# Patient Record
Sex: MALE
[De-identification: names, ages, dates, MRNs, and addresses within clinical notes are randomized per-mention and may not be internally consistent; named-entity substitution may affect disease eponyms.]

---

## 2022-04-11 PROBLEM — Z00.00 ENCOUNTER FOR PREVENTIVE HEALTH EXAMINATION: Noted: 2022-04-11

## 2022-04-22 PROBLEM — Z00.00 ENCOUNTER FOR PREVENTIVE HEALTH EXAMINATION: Status: ACTIVE | Noted: 2022-04-22

## 2022-05-12 ENCOUNTER — APPOINTMENT (OUTPATIENT)
Dept: ORTHOPEDIC SURGERY | Facility: CLINIC | Age: 67
End: 2022-05-12

## 2022-05-12 ENCOUNTER — NON-APPOINTMENT (OUTPATIENT)
Age: 67
End: 2022-05-12

## 2022-05-12 DIAGNOSIS — M54.50 LOW BACK PAIN, UNSPECIFIED: ICD-10-CM

## 2022-05-12 PROCEDURE — 99212 OFFICE O/P EST SF 10 MIN: CPT

## 2022-05-12 NOTE — REASON FOR VISIT
[FreeTextEntry2] : follow up/ lower back 
[FreeTextEntry2] : follow up/ lower back 
Patient Representative

## 2022-05-12 NOTE — IMAGING
[de-identified] : General:\par Strength: \par Hip Flexors: 5/5 b/l\par Quadriceps: 5/5 b/l\par Dorsi/Plantor flexion: 5/5 b/l\par EHL: 5/5 b/l\par \par Sensation: Intact b/l\par Straight Leg Raise: Negative b/l\par

## 2022-05-12 NOTE — ASSESSMENT
[FreeTextEntry1] : 66 y/o M with low back pain, improving with PT. Had good relieve with TPI given at last visit. Encouraged to continue to do exercises at home. Follow up PRN.

## 2022-05-12 NOTE — HISTORY OF PRESENT ILLNESS
[Gradual] : gradual [de-identified] : Injury Details: spine. Details of accident / injury: back surgery 1989 . The injury was gradual. On a scale of 0-10, the\par pain when active is 6. On a scale of 0-10, the pain when at rest is 7. The pain gets worse with motion. The quality of\par the pain is: Dull/Aching, Radiating, Shooting and Tingling The patient does not need support to ambulate. The patient\par was treated for this problem before, on 2/28/2022 by Dr. Guerrier.  [Lower back] : lower back [0] : 0 [Physical therapy] : physical therapy [Retired] : Work status: retired [] : no

## 2022-05-12 NOTE — HISTORY OF PRESENT ILLNESS
[Gradual] : gradual [de-identified] : Injury Details: spine. Details of accident / injury: back surgery 1989 . The injury was gradual. On a scale of 0-10, the\par pain when active is 6. On a scale of 0-10, the pain when at rest is 7. The pain gets worse with motion. The quality of\par the pain is: Dull/Aching, Radiating, Shooting and Tingling The patient does not need support to ambulate. The patient\par was treated for this problem before, on 2/28/2022 by Dr. Guerrier.  [Lower back] : lower back [0] : 0 [Physical therapy] : physical therapy [Retired] : Work status: retired [] : no

## 2024-10-15 NOTE — REASON FOR VISIT
Visit Information    Have you changed or started any medications since your last visit including any over-the-counter medicines, vitamins, or herbal medicines? no   Are you having any side effects from any of your medications? -  no  Have you stopped taking any of your medications? Is so, why? -  no    Have you seen any other physician or provider since your last visit? Yes - Records Requested  Have you had any other diagnostic tests since your last visit? Yes - Records Requested  Have you been seen in the emergency room and/or had an admission to a hospital since we last saw you? No  Have you had your routine dental cleaning in the past 6 months? no    Have you activated your [employer] account? If not, what are your barriers? Yes     Patient Care Team:  Reji Arambula MD as Consulting Physician (Orthopedic Surgery)    Medical History Review  Past Medical, Family, and Social History reviewed and does not contribute to the patient presenting condition    Health Maintenance   Topic Date Due    Pneumococcal 0-64 years Vaccine (1 of 2 - PCV) 05/03/2006    Depression Screen  Never done    DTaP/Tdap/Td vaccine (7 - Td or Tdap) 08/12/2021    Chlamydia/GC screen  02/21/2023    Flu vaccine (1) 08/01/2024    COVID-19 Vaccine (1 - 2023-24 season) Never done    Pap smear  02/21/2025    Hepatitis A vaccine  Completed    Hepatitis B vaccine  Completed    Hib vaccine  Completed    HPV vaccine  Completed    Polio vaccine  Completed    Varicella vaccine  Completed    Meningococcal (ACWY) vaccine  Completed    Hepatitis C screen  Completed    HIV screen  Completed       
Position: Sitting, Cuff Size: Medium Adult)   Pulse 67   Ht 1.6 m (5' 2.99\")   Wt 94.1 kg (207 lb 6.4 oz)   BMI 36.75 kg/m²    BP Readings from Last 3 Encounters:   10/15/24 131/85   09/12/24 (!) 133/94   09/11/24 129/81       Physical Exam  Vitals and nursing note reviewed.   Constitutional:       Appearance: Normal appearance.   Cardiovascular:      Rate and Rhythm: Normal rate and regular rhythm.      Heart sounds: Normal heart sounds.   Pulmonary:      Effort: Pulmonary effort is normal.      Breath sounds: Normal breath sounds.   Abdominal:      General: Abdomen is flat.      Tenderness: There is no abdominal tenderness.   Musculoskeletal:        Arms:       Right lower leg: No edema.      Left lower leg: No edema.      Comments: Tender points   Lymphadenopathy:      Cervical: No cervical adenopathy.   Neurological:      Mental Status: She is alert.   Psychiatric:         Mood and Affect: Mood normal.         Behavior: Behavior normal.         Thought Content: Thought content normal.         Judgment: Judgment normal.     Back    Lab Results   Component Value Date    WBC 5.5 10/07/2020    HGB 13.5 10/07/2020    HCT 41.3 10/07/2020     10/07/2020    CHOL 184 09/13/2019    TRIG 129 09/13/2019    HDL 51 09/13/2019    ALT 10 10/07/2020    CREATININE 0.57 10/07/2020    LABA1C 4.9 09/13/2019     No results found for: \"CALCIUM\", \"PHOS\"  No results found for: \"LDLDIRECT\"    Assessment and Plan:    1. ADHD on Adderall 20 mg bid - PDMP reviewed . LAs t script 1 month ago/medication contract signed.  - Urine Drug Screen;    2. LSIL in 2022- Offered PAP. Patient would like to see her OB/Gyn  3.SINDY- score 12- Start Buspar 5 mg bid  4.Musculoskeletal mid back pain- back exercises/Motrin as needed.         Requested Prescriptions     Signed Prescriptions Disp Refills    busPIRone (BUSPAR) 5 MG tablet 60 tablet 0     Sig: Take 1 tablet by mouth 2 times daily    ibuprofen (ADVIL;MOTRIN) 800 MG tablet 120 tablet 1     
[FreeTextEntry2] : follow up/

## 2025-02-26 ENCOUNTER — APPOINTMENT (OUTPATIENT)
Dept: ORTHOPEDIC SURGERY | Facility: CLINIC | Age: 70
End: 2025-02-26
Payer: MEDICARE

## 2025-02-26 VITALS — HEIGHT: 66 IN | BODY MASS INDEX: 26.52 KG/M2 | WEIGHT: 165 LBS

## 2025-02-26 DIAGNOSIS — S46.011A STRAIN OF MUSCLE(S) AND TENDON(S) OF THE ROTATOR CUFF OF RIGHT SHOULDER, INITIAL ENCOUNTER: ICD-10-CM

## 2025-02-26 DIAGNOSIS — S43.431A SUPERIOR GLENOID LABRUM LESION OF RIGHT SHOULDER, INITIAL ENCOUNTER: ICD-10-CM

## 2025-02-26 DIAGNOSIS — E78.00 PURE HYPERCHOLESTEROLEMIA, UNSPECIFIED: ICD-10-CM

## 2025-02-26 DIAGNOSIS — I10 ESSENTIAL (PRIMARY) HYPERTENSION: ICD-10-CM

## 2025-02-26 PROCEDURE — 99214 OFFICE O/P EST MOD 30 MIN: CPT | Mod: 25

## 2025-02-26 PROCEDURE — 73030 X-RAY EXAM OF SHOULDER: CPT | Mod: RT

## 2025-02-26 PROCEDURE — 20611 DRAIN/INJ JOINT/BURSA W/US: CPT | Mod: RT

## 2025-02-26 PROCEDURE — J3490M: CUSTOM | Mod: NC,JZ

## 2025-02-26 RX ORDER — AMOXICILLIN 500 MG/1
CAPSULE ORAL
Refills: 0 | Status: ACTIVE | COMMUNITY

## 2025-02-26 RX ORDER — ROSUVASTATIN CALCIUM 20 MG/1
20 TABLET, FILM COATED ORAL
Refills: 0 | Status: ACTIVE | COMMUNITY

## 2025-02-26 RX ORDER — BENAZEPRIL HYDROCHLORIDE 10 MG/1
10 TABLET, FILM COATED ORAL
Refills: 0 | Status: ACTIVE | COMMUNITY

## 2025-02-26 RX ORDER — MELOXICAM 15 MG/1
15 TABLET ORAL
Qty: 30 | Refills: 1 | Status: ACTIVE | COMMUNITY
Start: 2025-02-26 | End: 1900-01-01

## 2025-02-27 ENCOUNTER — APPOINTMENT (OUTPATIENT)
Dept: MRI IMAGING | Facility: CLINIC | Age: 70
End: 2025-02-27
Payer: MEDICARE

## 2025-02-27 PROCEDURE — 73221 MRI JOINT UPR EXTREM W/O DYE: CPT | Mod: RT

## 2025-03-03 PROBLEM — S43.431A SUPERIOR GLENOID LABRUM LESION OF RIGHT SHOULDER, INITIAL ENCOUNTER: Status: ACTIVE | Noted: 2025-03-03

## 2025-03-03 PROBLEM — S46.011A TRAUMATIC COMPLETE TEAR OF RIGHT ROTATOR CUFF, INITIAL ENCOUNTER: Status: ACTIVE | Noted: 2025-02-26

## 2025-03-12 ENCOUNTER — APPOINTMENT (OUTPATIENT)
Dept: ORTHOPEDIC SURGERY | Facility: CLINIC | Age: 70
End: 2025-03-12
Payer: MEDICARE

## 2025-03-12 DIAGNOSIS — S43.431A SUPERIOR GLENOID LABRUM LESION OF RIGHT SHOULDER, INITIAL ENCOUNTER: ICD-10-CM

## 2025-03-12 DIAGNOSIS — S43.001A UNSPECIFIED SUBLUXATION OF RIGHT SHOULDER JOINT, INITIAL ENCOUNTER: ICD-10-CM

## 2025-03-12 DIAGNOSIS — S46.811A STRAIN OF OTHER MUSCLES, FASCIA AND TENDONS AT SHOULDER AND UPPER ARM LEVEL, RIGHT ARM, INITIAL ENCOUNTER: ICD-10-CM

## 2025-03-12 PROCEDURE — 99214 OFFICE O/P EST MOD 30 MIN: CPT

## 2025-04-24 ENCOUNTER — APPOINTMENT (OUTPATIENT)
Dept: ORTHOPEDIC SURGERY | Facility: CLINIC | Age: 70
End: 2025-04-24

## 2025-04-24 PROCEDURE — L3670: CPT | Mod: RT

## 2025-04-28 ENCOUNTER — APPOINTMENT (OUTPATIENT)
Age: 70
End: 2025-04-28
Payer: MEDICARE

## 2025-04-28 PROCEDURE — 29828 SHO ARTHRS SRG BICP TENODSIS: CPT | Mod: RT

## 2025-04-28 PROCEDURE — 29826 SHO ARTHRS SRG DECOMPRESSION: CPT | Mod: AS,RT

## 2025-04-28 PROCEDURE — 29824 SHO ARTHRS SRG DSTL CLAVICLC: CPT | Mod: RT

## 2025-04-28 PROCEDURE — 29823 SHO ARTHRS SRG XTNSV DBRDMT: CPT | Mod: 59,RT

## 2025-04-28 PROCEDURE — 29826 SHO ARTHRS SRG DECOMPRESSION: CPT | Mod: RT

## 2025-04-28 PROCEDURE — 29828 SHO ARTHRS SRG BICP TENODSIS: CPT | Mod: AS,RT

## 2025-04-28 PROCEDURE — 29823 SHO ARTHRS SRG XTNSV DBRDMT: CPT | Mod: AS,59,RT

## 2025-04-28 PROCEDURE — 29824 SHO ARTHRS SRG DSTL CLAVICLC: CPT | Mod: AS,RT

## 2025-05-06 ENCOUNTER — APPOINTMENT (OUTPATIENT)
Dept: ORTHOPEDIC SURGERY | Facility: CLINIC | Age: 70
End: 2025-05-06
Payer: MEDICARE

## 2025-05-06 VITALS — WEIGHT: 165 LBS | HEIGHT: 66 IN | BODY MASS INDEX: 26.52 KG/M2

## 2025-05-06 DIAGNOSIS — M75.51 BURSITIS OF RIGHT SHOULDER: ICD-10-CM

## 2025-05-06 DIAGNOSIS — Z98.890 OTHER SPECIFIED POSTPROCEDURAL STATES: ICD-10-CM

## 2025-05-06 PROCEDURE — 99024 POSTOP FOLLOW-UP VISIT: CPT

## 2025-05-06 RX ORDER — OXYCODONE AND ACETAMINOPHEN 10; 325 MG/1; MG/1
10-325 TABLET ORAL
Qty: 20 | Refills: 0 | Status: COMPLETED | COMMUNITY
Start: 2025-04-28 | End: 2025-05-06

## 2025-06-03 ENCOUNTER — APPOINTMENT (OUTPATIENT)
Dept: ORTHOPEDIC SURGERY | Facility: CLINIC | Age: 70
End: 2025-06-03
Payer: MEDICARE

## 2025-06-03 PROCEDURE — 99024 POSTOP FOLLOW-UP VISIT: CPT

## 2025-07-22 ENCOUNTER — APPOINTMENT (OUTPATIENT)
Dept: ORTHOPEDIC SURGERY | Facility: CLINIC | Age: 70
End: 2025-07-22
Payer: MEDICARE

## 2025-07-22 DIAGNOSIS — M75.51 BURSITIS OF RIGHT SHOULDER: ICD-10-CM

## 2025-07-22 DIAGNOSIS — Z98.890 OTHER SPECIFIED POSTPROCEDURAL STATES: ICD-10-CM

## 2025-07-22 PROCEDURE — 99024 POSTOP FOLLOW-UP VISIT: CPT

## 2025-07-29 PROBLEM — M75.51 BURSITIS OF RIGHT SHOULDER: Status: RESOLVED | Noted: 2025-05-06 | Resolved: 2025-07-29

## 2025-07-29 PROBLEM — M75.51 BURSITIS OF RIGHT SHOULDER: Status: ACTIVE | Noted: 2025-07-29

## 2025-08-18 ENCOUNTER — APPOINTMENT (OUTPATIENT)
Dept: ORTHOPEDIC SURGERY | Facility: CLINIC | Age: 70
End: 2025-08-18
Payer: MEDICARE

## 2025-08-18 VITALS — HEIGHT: 66 IN | BODY MASS INDEX: 27.32 KG/M2 | WEIGHT: 170 LBS

## 2025-08-18 DIAGNOSIS — S63.655A SPRAIN OF METACARPOPHALANGEAL JOINT OF LEFT RING FINGER, INITIAL ENCOUNTER: ICD-10-CM

## 2025-08-18 PROCEDURE — 73140 X-RAY EXAM OF FINGER(S): CPT | Mod: LT

## 2025-08-18 PROCEDURE — 99213 OFFICE O/P EST LOW 20 MIN: CPT

## 2025-08-26 ENCOUNTER — APPOINTMENT (OUTPATIENT)
Dept: ORTHOPEDIC SURGERY | Facility: CLINIC | Age: 70
End: 2025-08-26

## 2025-08-26 PROCEDURE — J3490M: CUSTOM | Mod: NC

## 2025-08-26 PROCEDURE — 99213 OFFICE O/P EST LOW 20 MIN: CPT | Mod: 25

## 2025-08-26 PROCEDURE — 20611 DRAIN/INJ JOINT/BURSA W/US: CPT | Mod: RT
